# Patient Record
Sex: FEMALE | Race: WHITE | NOT HISPANIC OR LATINO | Employment: FULL TIME | URBAN - METROPOLITAN AREA
[De-identification: names, ages, dates, MRNs, and addresses within clinical notes are randomized per-mention and may not be internally consistent; named-entity substitution may affect disease eponyms.]

---

## 2024-02-27 ENCOUNTER — OFFICE VISIT (OUTPATIENT)
Dept: INTERNAL MEDICINE | Facility: CLINIC | Age: 29
End: 2024-02-27
Payer: COMMERCIAL

## 2024-02-27 VITALS
HEIGHT: 64 IN | TEMPERATURE: 97.6 F | WEIGHT: 203 LBS | RESPIRATION RATE: 19 BRPM | BODY MASS INDEX: 34.66 KG/M2 | DIASTOLIC BLOOD PRESSURE: 79 MMHG | OXYGEN SATURATION: 99 % | HEART RATE: 62 BPM | SYSTOLIC BLOOD PRESSURE: 112 MMHG

## 2024-02-27 DIAGNOSIS — J45.20 MILD INTERMITTENT ASTHMA WITHOUT COMPLICATION: ICD-10-CM

## 2024-02-27 DIAGNOSIS — J02.0 STREPTOCOCCAL PHARYNGITIS: Primary | ICD-10-CM

## 2024-02-27 DIAGNOSIS — B97.89 VIRAL RESPIRATORY INFECTION: Primary | ICD-10-CM

## 2024-02-27 DIAGNOSIS — J98.8 VIRAL RESPIRATORY INFECTION: Primary | ICD-10-CM

## 2024-02-27 LAB
DEPRECATED S PYO AG THROAT QL EIA: POSITIVE
FLUAV RNA SPEC QL NAA+PROBE: NEGATIVE
FLUBV RNA RESP QL NAA+PROBE: NEGATIVE
RSV RNA SPEC NAA+PROBE: NEGATIVE
SARS-COV-2 RNA RESP QL NAA+PROBE: POSITIVE

## 2024-02-27 PROCEDURE — 87880 STREP A ASSAY W/OPTIC: CPT | Performed by: INTERNAL MEDICINE

## 2024-02-27 PROCEDURE — 99203 OFFICE O/P NEW LOW 30 MIN: CPT | Performed by: INTERNAL MEDICINE

## 2024-02-27 PROCEDURE — 87637 SARSCOV2&INF A&B&RSV AMP PRB: CPT | Performed by: INTERNAL MEDICINE

## 2024-02-27 RX ORDER — AMOXICILLIN AND CLAVULANATE POTASSIUM 500; 125 MG/1; MG/1
1 TABLET, FILM COATED ORAL 2 TIMES DAILY
Qty: 20 TABLET | Refills: 0 | Status: SHIPPED | OUTPATIENT
Start: 2024-02-27 | End: 2024-02-28

## 2024-02-27 RX ORDER — ALBUTEROL SULFATE 90 UG/1
2 AEROSOL, METERED RESPIRATORY (INHALATION) EVERY 6 HOURS PRN
COMMUNITY
Start: 2024-02-27

## 2024-02-27 RX ORDER — LORATADINE 10 MG/1
10 TABLET ORAL
COMMUNITY

## 2024-02-27 RX ORDER — GUAIFENESIN 600 MG/1
600 TABLET, EXTENDED RELEASE ORAL 2 TIMES DAILY
Qty: 60 TABLET | Refills: 2 | Status: SHIPPED | OUTPATIENT
Start: 2024-02-27 | End: 2024-02-28

## 2024-02-27 RX ORDER — PREDNISONE 20 MG/1
20 TABLET ORAL EVERY MORNING
Qty: 20 TABLET | Refills: 1 | Status: SHIPPED | OUTPATIENT
Start: 2024-02-27 | End: 2024-02-28

## 2024-02-27 RX ORDER — LIDOCAINE HYDROCHLORIDE 20 MG/ML
15 SOLUTION OROPHARYNGEAL
Qty: 100 ML | Refills: 3 | Status: SHIPPED | OUTPATIENT
Start: 2024-02-27 | End: 2024-02-28

## 2024-02-27 RX ORDER — BENZONATATE 200 MG/1
200 CAPSULE ORAL 3 TIMES DAILY PRN
Qty: 20 CAPSULE | Refills: 1 | Status: SHIPPED | OUTPATIENT
Start: 2024-02-27 | End: 2024-02-28

## 2024-02-27 NOTE — PATIENT INSTRUCTIONS
If you have strep, we use amoxicillin    If you have RSV, no treatment    If you have Covid or Influenza, I recommend against anti virals because your immunity will be better naturally, and you are young and healthy    You have an inhaler    Now you have prednisone to treat a few days for viral attacks    Consider yourself contagious for a few days

## 2024-02-27 NOTE — PROGRESS NOTES
ASSESSMENT and PLAN:  1. Viral respiratory infection  Probable COVID.  Screen for same.  Would not treat.  Treat for strep if positive  - Streptococcus A Rapid Screen w/Reflex to PCR - Clinic Collect  - Symptomatic Influenza A/B, RSV, & SARS-CoV2 PCR (COVID-19); Future  - Symptomatic Influenza A/B, RSV, & SARS-CoV2 PCR (COVID-19) Nasopharyngeal    2. Mild intermittent asthma without complication  Confirm inhaler  - albuterol (PROAIR HFA/PROVENTIL HFA/VENTOLIN HFA) 108 (90 Base) MCG/ACT inhaler; Inhale 2 puffs into the lungs every 6 hours as needed for shortness of breath or wheezing       Patient Instructions   If you have strep, we use amoxicillin    If you have RSV, no treatment    If you have Covid or Influenza, I recommend against anti virals because your immunity will be better naturally, and you are young and healthy    You have an inhaler    Now you have prednisone to treat a few days for viral attacks    Consider yourself contagious for a few days            Return in about 1 year (around 2/27/2025) for using a video visit.       CHIEF COMPLAINT:  Chief Complaint   Patient presents with     URI     Body aches for a few days and sore throat, turned into nasal drip, now tonsils are inflamed. Patient took ibuprofen this morning at 7am           2/27/2024    12:27 PM   Additional Questions   Roomed by Martha GRIMES       HISTORY OF PRESENT ILLNESS:  Haley is a 28 year old female presenting to the clinic today for complaints of viral syndrome.  She reports body aches all week with a sore throat.  Yesterday she developed the acute onset of more diffuse respiratory viral symptoms with sore throat, rhinorrhea, low-grade fever, and cough.  She has taken prednisone in the past which have helped sore throat and laryngitis    History of asthma.  Has inhaler    History of Present Illness       Reason for visit:  Soar throat huts to swallow and talk.  Symptom onset:  3-7 days ago  Symptoms include:  Soar throat, nasal  "congestion, body aches  Symptom intensity:  Severe  Symptom progression:  Staying the same  Had these symptoms before:  No  What makes it worse:  Swallowing and talking.  What makes it better:  Not doing those things.    She eats 0-1 servings of fruits and vegetables daily.She consumes 2 sweetened beverage(s) daily.She exercises with enough effort to increase her heart rate 30 to 60 minutes per day.  She exercises with enough effort to increase her heart rate 4 days per week.   She is taking medications regularly.    REVIEW OF SYSTEMS:   Otherwise healthy    Today's PHQ-2 Score:       2/27/2024     8:21 AM   PHQ-2 ( 1999 [employer])   Q1: Little interest or pleasure in doing things 0   Q2: Feeling down, depressed or hopeless 0   PHQ-2 Score 0   Q1: Little interest or pleasure in doing things Not at all   Q2: Feeling down, depressed or hopeless Not at all   PHQ-2 Score 0       PFSH:  Social History     Social History Narrative     Not on file       TOBACCO USE:  History   Smoking Status     Never   Smokeless Tobacco     Never       VITALS:  Vitals:    02/27/24 1231   BP: 112/79   BP Location: Left arm   Patient Position: Sitting   Cuff Size: Adult Regular   Pulse: 62   Resp: 19   Temp: 97.6  F (36.4  C)   TempSrc: Tympanic   SpO2: 99%   Weight: 92.1 kg (203 lb)   Height: 1.626 m (5' 4\")     Wt Readings from Last 3 Encounters:   02/27/24 92.1 kg (203 lb)     Body mass index is 34.84 kg/m .    PHYSICAL EXAM:  Wearing mask when entering room?  Yes  Constitutional:  Reveals alert pleasant woman who ambulates without difficulty    Oropharynx without obvious whitish exudate.  Neck supple.  No adenopathy.  Heart regular rate and rhythm.  Lungs clear without wheeze    MEDICATIONS:  Current Outpatient Medications   Medication Sig Dispense Refill     albuterol (PROAIR HFA/PROVENTIL HFA/VENTOLIN HFA) 108 (90 Base) MCG/ACT inhaler Inhale 2 puffs into the lungs every 6 hours as needed for shortness of breath or wheezing       " "amoxicillin-clavulanate (AUGMENTIN) 500-125 MG tablet Take 1 tablet by mouth 2 times daily 20 tablet 0     benzonatate (TESSALON) 200 MG capsule Take 1 capsule (200 mg) by mouth 3 times daily as needed for cough 20 capsule 1     guaiFENesin (MUCINEX) 600 MG 12 hr tablet Take 1 tablet (600 mg) by mouth 2 times daily 60 tablet 2     lidocaine, viscous, (XYLOCAINE) 2 % solution Swish and swallow 15 mLs in mouth every 3 hours as needed for moderate pain Max 8 doses/24 hour period. 100 mL 3     loratadine (CLARITIN) 10 MG tablet Take 10 mg by mouth       predniSONE (DELTASONE) 20 MG tablet Take 1 tablet (20 mg) by mouth every morning And repeat as needed for future attacks 20 tablet 1       Notes summarized:   Labs, x-rays, cardiology, GI tests reviewed:   No results for input(s): \"HGB\", \"NA\", \"POTASSIUM\", \"CR\", \"A1C\", \"PSA\", \"URIC\", \"B12\", \"TSH\", \"VITDT\" in the last 87241 hours.  No results found for: \"CHOL\"  New orders:   Orders Placed This Encounter   Procedures     Symptomatic Influenza A/B, RSV, & SARS-CoV2 PCR (COVID-19)     Streptococcus A Rapid Screen w/Reflex to PCR - Clinic Collect       Independent review of:  Supplemental history by:      Start Time: 12:46 PM  End time:   12:56 PM  Conversation plus orders: 10 minutes  Dictation time:  3 minutes    The visit lasted a total of 10 minutes     Raza Chaudhry MD        "

## 2024-02-27 NOTE — PROGRESS NOTES
"  {PROVIDER CHARTING PREFERENCE:012239}    Prerna De Leon is a 28 year old, presenting for the following health issues:  URI (Body aches for a few days and sore throat, turned into nasal drip, now tonsils are inflamed. Patient took ibuprofen this morning at 7am)      2/27/2024    12:27 PM   Additional Questions   Roomed by Martha SKINNER    History of Present Illness       Reason for visit:  Soar throat huts to swallow and talk.  Symptom onset:  3-7 days ago  Symptoms include:  Soar throat, nasal congestion, body aches  Symptom intensity:  Severe  Symptom progression:  Staying the same  Had these symptoms before:  No  What makes it worse:  Swallowing and talking.  What makes it better:  Not doing those things.    She eats 0-1 servings of fruits and vegetables daily.She consumes 2 sweetened beverage(s) daily.She exercises with enough effort to increase her heart rate 30 to 60 minutes per day.  She exercises with enough effort to increase her heart rate 4 days per week.   She is taking medications regularly.       {MA/LPN/RN Pre-Provider Visit Orders- hCG/UA/Strep (Optional):473302}  {SUPERLIST (Optional):354920}  {additonal problems for provider to add (Optional):690320}    {ROS Picklists (Optional):413750}      Objective    /79 (BP Location: Left arm, Patient Position: Sitting, Cuff Size: Adult Regular)   Pulse 62   Temp 97.6  F (36.4  C) (Tympanic)   Resp 19   Ht 1.626 m (5' 4\")   Wt 92.1 kg (203 lb)   LMP 02/27/2024 (Exact Date)   SpO2 99%   BMI 34.84 kg/m    Body mass index is 34.84 kg/m .  Physical Exam   {Exam List (Optional):345786}    {Diagnostic Test Results (Optional):271034}        Signed Electronically by: Raza Chaudhry MD  {Email feedback regarding this note to [email or web address]   :590457}  "

## 2024-02-28 ENCOUNTER — TELEPHONE (OUTPATIENT)
Dept: NURSING | Facility: CLINIC | Age: 29
End: 2024-02-28
Payer: COMMERCIAL

## 2024-02-28 ENCOUNTER — TELEPHONE (OUTPATIENT)
Dept: INTERNAL MEDICINE | Facility: CLINIC | Age: 29
End: 2024-02-28
Payer: COMMERCIAL

## 2024-02-28 ENCOUNTER — TELEPHONE (OUTPATIENT)
Dept: FAMILY MEDICINE | Facility: CLINIC | Age: 29
End: 2024-02-28
Payer: COMMERCIAL

## 2024-02-28 DIAGNOSIS — J98.8 VIRAL RESPIRATORY INFECTION: ICD-10-CM

## 2024-02-28 DIAGNOSIS — B97.89 VIRAL RESPIRATORY INFECTION: ICD-10-CM

## 2024-02-28 DIAGNOSIS — J45.20 MILD INTERMITTENT ASTHMA WITHOUT COMPLICATION: ICD-10-CM

## 2024-02-28 DIAGNOSIS — J02.0 STREPTOCOCCAL PHARYNGITIS: ICD-10-CM

## 2024-02-28 RX ORDER — AMOXICILLIN AND CLAVULANATE POTASSIUM 500; 125 MG/1; MG/1
1 TABLET, FILM COATED ORAL 2 TIMES DAILY
Qty: 20 TABLET | Refills: 0 | Status: SHIPPED | OUTPATIENT
Start: 2024-02-28

## 2024-02-28 RX ORDER — LIDOCAINE HYDROCHLORIDE 20 MG/ML
15 SOLUTION OROPHARYNGEAL
Qty: 100 ML | Refills: 3 | Status: SHIPPED | OUTPATIENT
Start: 2024-02-28

## 2024-02-28 RX ORDER — BENZONATATE 200 MG/1
200 CAPSULE ORAL 3 TIMES DAILY PRN
Qty: 20 CAPSULE | Refills: 1 | Status: SHIPPED | OUTPATIENT
Start: 2024-02-28

## 2024-02-28 RX ORDER — GUAIFENESIN 600 MG/1
600 TABLET, EXTENDED RELEASE ORAL 2 TIMES DAILY
Qty: 60 TABLET | Refills: 2 | Status: SHIPPED | OUTPATIENT
Start: 2024-02-28

## 2024-02-28 RX ORDER — PREDNISONE 20 MG/1
20 TABLET ORAL EVERY MORNING
Qty: 20 TABLET | Refills: 1 | Status: SHIPPED | OUTPATIENT
Start: 2024-02-28

## 2024-02-28 NOTE — TELEPHONE ENCOUNTER
Test Results        Who ordered the test:  KARIN BELL    Type of test: Lab    Date of test:  02/27/2024    Where was the test performed:  [facility]    What are your questions/concerns?:  PT WOULD LIKE CONFIRMATION ABOUT HER STREP TEST. WOULD LIKE RESULTS.    Could we send this information to you in [employer] or would you prefer to receive a phone call?:   Patient would prefer a phone call   Okay to leave a detailed message?: Yes at Cell number on file:    Telephone Information:   Mobile [phone]

## 2024-02-28 NOTE — TELEPHONE ENCOUNTER
Relayed to patient she did test positive for Strep and an antibiotic was sent to pharmacy.  Patient is also positive for COVID and did speak to a member of the care team regarding home care and isolation.  No further questions.

## 2024-02-28 NOTE — TELEPHONE ENCOUNTER
Coronavirus (COVID-19) Notification    Caller Name (Patient, parent, daughter/son, grandparent, etc)  MILA OSMANE    Reason for call  Notify of Positive Coronavirus (COVID-19) lab results, assess symptoms,  review [facility] recommendations    Lab Result    Lab test:  2019-nCoV rRt-PCR or SARS-CoV-2 PCR    Oropharyngeal AND/OR nasopharyngeal swabs is POSITIVE for 2019-nCoV RNA/SARS-COV-2 PCR (COVID-19 virus)      Gather patient reported symptoms   Assessment   Current Symptoms at time of phone call, reported by patient: (if no symptoms, document: No symptoms]  SORE THROAT   Date of symptom(s) onset (if applicable)  02/24/2024     If at time of call, Patients symptoms have worsened, the Patient should contact 911 or have someone drive them to Emergency Dept promptly:    If Patient calling 911, inform 911 personal that you have tested positive for the Coronavirus (COVID-19).  Place mask on and await 911 to arrive.  If Emergency Dept, If possible, please have another adult drive you to the Emergency Dept but you need to wear mask when in contact with other people.      Treatment Options:   Is patient interested in discussing COVID treatment? No.        Review information with Patient    Your result was positive. This means you have COVID-19 (coronavirus).    How can I protect others?    These guidelines are for isolating before returning to work, school or .    If you DO have symptoms  Stay home and away from others   For at least 5 days after your symptoms started, AND  You are fever free for 24 hours (with no medicine that reduces fever), AND  Your other symptoms are better  Wear a mask for 10 full days anytime you are around others    If you DON'T have symptoms  Stay home and away from others for at least 5 days after your positive test  Wear a mask for 10 full days anytime you are around others    There may be different guidelines for healthcare facilities.  Please check with the specific sites  before arriving.    If you have been told by a doctor that you were severely ill with COVID-19 or are immunocompromised, you should isolate for at least 10 days.    You should not go back to work until you meet the guidelines above for ending your home isolation. You don't need to be retested for COVID-19 before going back to work--studies show that you won't spread the virus if it's been at least 10 days since your symptoms started (or 20 days, if you have a weak immune system).    Employers, schools, and daycares: This is an official notice for this person's medical guidelines for returning in-person.  They must meet the above guidelines before going back to work, school or  in person.    You will receive a positive COVID-19 letter via [email or web address] or the mail soon with additional self-care information.    Would you like me to review some of that information with you now?  No    If you were tested for an upcoming procedure, please contact your provider for next steps.    Zahida Morales

## 2024-02-28 NOTE — TELEPHONE ENCOUNTER
Patient requesting medications be sent to [facility] [address] in [facility].  Rxs sent to pharmacy as requested.

## 2024-02-28 NOTE — TELEPHONE ENCOUNTER
General Call    Contacts         Type Contact Phone/Fax    02/28/2024 12:24 PM CST Phone (Incoming) IoniaHaley zamora (Self) [phone] (H)          Reason for Call:  Pt called anywayanyday and did not get any medications sent in yesterday    What are your questions or concerns:  Pt is wondering if someone can call into the pharmacy for her so she can get her medications please.    Pt is positive for Covid and Strep and would like to start her medications.    anywayanyday [phone]    Date of last appointment with provider: 2/27/24    Could we send this information to you in [employer] or would you prefer to receive a phone call?:   Patient would prefer a phone call   Okay to leave a detailed message?: Yes at Cell number on file:    Telephone Information:   Mobile [phone]

## 2024-03-24 ENCOUNTER — HEALTH MAINTENANCE LETTER (OUTPATIENT)
Age: 29
End: 2024-03-24

## 2024-05-13 ENCOUNTER — OFFICE VISIT (OUTPATIENT)
Dept: URGENT CARE | Facility: URGENT CARE | Age: 29
End: 2024-05-13
Payer: COMMERCIAL

## 2024-05-13 VITALS
BODY MASS INDEX: 36.87 KG/M2 | TEMPERATURE: 97.3 F | DIASTOLIC BLOOD PRESSURE: 84 MMHG | WEIGHT: 214.8 LBS | OXYGEN SATURATION: 98 % | HEART RATE: 78 BPM | SYSTOLIC BLOOD PRESSURE: 121 MMHG | RESPIRATION RATE: 20 BRPM

## 2024-05-13 DIAGNOSIS — H10.32 ACUTE BACTERIAL CONJUNCTIVITIS OF LEFT EYE: Primary | ICD-10-CM

## 2024-05-13 PROCEDURE — 99213 OFFICE O/P EST LOW 20 MIN: CPT

## 2024-05-13 RX ORDER — POLYMYXIN B SULFATE AND TRIMETHOPRIM 1; 10000 MG/ML; [USP'U]/ML
2 SOLUTION OPHTHALMIC EVERY 6 HOURS
Qty: 10 ML | Refills: 0 | Status: SHIPPED | OUTPATIENT
Start: 2024-05-13 | End: 2024-05-20

## 2024-05-13 NOTE — PROGRESS NOTES
ASSESSMENT:  (H10.32) Acute bacterial conjunctivitis of left eye  (primary encounter diagnosis)  Plan: polymixin b-trimethoprim (POLYTRIM) 24745-0.1         UNIT/ML-% ophthalmic solution    PLAN:  Conjunctivitis patient instructions discussed and provided.  Informed the patient to use eyedrops as prescribed and finish the full course even if symptoms improve.  We discussed using warm compress to the left eye with 10 to 15 minutes on each application for the eye symptoms.  We also discussed the need to return to clinic with any new or worsening symptoms.  Patient acknowledged her understanding of the above plan.    The use of Dragon/[employer] services may have been used to construct the content in this note; any grammatical or spelling errors are non-intentional. Please contact the author of this note directly if you are in need of any clarification.      DEMETRIUS Bergeron CNP    SUBJECTIVE:  Haley Alamo is a 28 year old female who presents complaining of mild left eye discomfort, redness, itching and watering for 3 days.   Details:  Associated Signs and Syptoms: none  Treatment measures tried include: none  Contact wearer : Yes     Patient had an exposure at work to a child that had conjunctivitis requiring antibiotic eyedrops for treatment.    ROS: negative except noted above      OBJECTIVE:  /84 (BP Location: Left arm, Patient Position: Sitting, Cuff Size: Adult Large)   Pulse 78   Temp 97.3  F (36.3  C) (Tympanic)   Resp 20   Wt 97.4 kg (214 lb 12.8 oz)   LMP 05/12/2024   SpO2 98%   BMI 36.87 kg/m    General: no acute distress  Eye exam: left eye: lids normal; PERRL, EOM's intact; mild conjunctival injection; increased tearing compared to right eye

## 2024-05-13 NOTE — PATIENT INSTRUCTIONS
Use the eyedrops as prescribed and finish the full course even if symptoms improve.  Use warm compress to the left eye with 10 to 15 minutes on each application for the eye symptoms.

## 2025-04-12 ENCOUNTER — HEALTH MAINTENANCE LETTER (OUTPATIENT)
Age: 30
End: 2025-04-12